# Patient Record
Sex: MALE | Race: WHITE | Employment: UNEMPLOYED | URBAN - METROPOLITAN AREA
[De-identification: names, ages, dates, MRNs, and addresses within clinical notes are randomized per-mention and may not be internally consistent; named-entity substitution may affect disease eponyms.]

---

## 2019-04-25 ENCOUNTER — HOSPITAL ENCOUNTER (INPATIENT)
Age: 29
LOS: 4 days | Discharge: HOME | DRG: 885 | End: 2019-04-29
Attending: EMERGENCY MEDICINE | Admitting: PSYCHIATRY & NEUROLOGY
Payer: MEDICAID

## 2019-04-25 DIAGNOSIS — F10.920 ACUTE ALCOHOLIC INTOXICATION WITHOUT COMPLICATION (HCC): ICD-10-CM

## 2019-04-25 DIAGNOSIS — R45.851 DEPRESSION WITH SUICIDAL IDEATION: Primary | ICD-10-CM

## 2019-04-25 DIAGNOSIS — F32.A DEPRESSION WITH SUICIDAL IDEATION: Primary | ICD-10-CM

## 2019-04-25 PROBLEM — F32.9 MAJOR DEPRESSION, SINGLE EPISODE: Status: ACTIVE | Noted: 2019-04-25

## 2019-04-25 LAB
ACETAMINOPHEN LEVEL: <5 MCG/ML (ref 10–30)
ALBUMIN SERPL-MCNC: 4.3 G/DL (ref 3.5–5.2)
ALP BLD-CCNC: 69 U/L (ref 40–129)
ALT SERPL-CCNC: 54 U/L (ref 0–40)
AMPHETAMINE SCREEN, URINE: NOT DETECTED
ANION GAP SERPL CALCULATED.3IONS-SCNC: 9 MMOL/L (ref 7–16)
AST SERPL-CCNC: 31 U/L (ref 0–39)
BARBITURATE SCREEN URINE: NOT DETECTED
BASOPHILS ABSOLUTE: 0.05 E9/L (ref 0–0.2)
BASOPHILS RELATIVE PERCENT: 0.7 % (ref 0–2)
BENZODIAZEPINE SCREEN, URINE: NOT DETECTED
BILIRUB SERPL-MCNC: 0.5 MG/DL (ref 0–1.2)
BILIRUBIN URINE: NEGATIVE
BLOOD, URINE: NEGATIVE
BUN BLDV-MCNC: 12 MG/DL (ref 6–20)
CALCIUM SERPL-MCNC: 9 MG/DL (ref 8.6–10.2)
CANNABINOID SCREEN URINE: NOT DETECTED
CHLORIDE BLD-SCNC: 103 MMOL/L (ref 98–107)
CLARITY: CLEAR
CO2: 30 MMOL/L (ref 22–29)
COCAINE METABOLITE SCREEN URINE: NOT DETECTED
COLOR: YELLOW
CREAT SERPL-MCNC: 1 MG/DL (ref 0.7–1.2)
EOSINOPHILS ABSOLUTE: 0.1 E9/L (ref 0.05–0.5)
EOSINOPHILS RELATIVE PERCENT: 1.3 % (ref 0–6)
ETHANOL: 70 MG/DL (ref 0–0.08)
GFR AFRICAN AMERICAN: >60
GFR NON-AFRICAN AMERICAN: >60 ML/MIN/1.73
GLUCOSE BLD-MCNC: 120 MG/DL (ref 74–99)
GLUCOSE URINE: NEGATIVE MG/DL
HCT VFR BLD CALC: 43 % (ref 37–54)
HEMOGLOBIN: 14.6 G/DL (ref 12.5–16.5)
IMMATURE GRANULOCYTES #: 0.02 E9/L
IMMATURE GRANULOCYTES %: 0.3 % (ref 0–5)
KETONES, URINE: NEGATIVE MG/DL
LEUKOCYTE ESTERASE, URINE: NEGATIVE
LYMPHOCYTES ABSOLUTE: 1.62 E9/L (ref 1.5–4)
LYMPHOCYTES RELATIVE PERCENT: 21.2 % (ref 20–42)
MCH RBC QN AUTO: 31.4 PG (ref 26–35)
MCHC RBC AUTO-ENTMCNC: 34 % (ref 32–34.5)
MCV RBC AUTO: 92.5 FL (ref 80–99.9)
METHADONE SCREEN, URINE: NOT DETECTED
MONOCYTES ABSOLUTE: 0.59 E9/L (ref 0.1–0.95)
MONOCYTES RELATIVE PERCENT: 7.7 % (ref 2–12)
NEUTROPHILS ABSOLUTE: 5.25 E9/L (ref 1.8–7.3)
NEUTROPHILS RELATIVE PERCENT: 68.8 % (ref 43–80)
NITRITE, URINE: NEGATIVE
OPIATE SCREEN URINE: NOT DETECTED
PDW BLD-RTO: 12.5 FL (ref 11.5–15)
PH UA: 5.5 (ref 5–9)
PHENCYCLIDINE SCREEN URINE: NOT DETECTED
PLATELET # BLD: 234 E9/L (ref 130–450)
PMV BLD AUTO: 9.6 FL (ref 7–12)
POTASSIUM SERPL-SCNC: 3.6 MMOL/L (ref 3.5–5)
PROPOXYPHENE SCREEN: NOT DETECTED
PROTEIN UA: NEGATIVE MG/DL
RBC # BLD: 4.65 E12/L (ref 3.8–5.8)
SALICYLATE, SERUM: <0.3 MG/DL (ref 0–30)
SODIUM BLD-SCNC: 142 MMOL/L (ref 132–146)
SPECIFIC GRAVITY UA: 1.02 (ref 1–1.03)
TOTAL PROTEIN: 7.2 G/DL (ref 6.4–8.3)
TRICYCLIC ANTIDEPRESSANTS SCREEN SERUM: NEGATIVE NG/ML
UROBILINOGEN, URINE: 1 E.U./DL
WBC # BLD: 7.6 E9/L (ref 4.5–11.5)

## 2019-04-25 PROCEDURE — 36415 COLL VENOUS BLD VENIPUNCTURE: CPT

## 2019-04-25 PROCEDURE — 1240000000 HC EMOTIONAL WELLNESS R&B

## 2019-04-25 PROCEDURE — G0480 DRUG TEST DEF 1-7 CLASSES: HCPCS

## 2019-04-25 PROCEDURE — 93005 ELECTROCARDIOGRAM TRACING: CPT | Performed by: EMERGENCY MEDICINE

## 2019-04-25 PROCEDURE — 99285 EMERGENCY DEPT VISIT HI MDM: CPT

## 2019-04-25 PROCEDURE — 85025 COMPLETE CBC W/AUTO DIFF WBC: CPT

## 2019-04-25 PROCEDURE — 80307 DRUG TEST PRSMV CHEM ANLYZR: CPT

## 2019-04-25 PROCEDURE — 80053 COMPREHEN METABOLIC PANEL: CPT

## 2019-04-25 PROCEDURE — 81003 URINALYSIS AUTO W/O SCOPE: CPT

## 2019-04-25 RX ORDER — TRAZODONE HYDROCHLORIDE 50 MG/1
50 TABLET ORAL NIGHTLY PRN
Status: DISCONTINUED | OUTPATIENT
Start: 2019-04-25 | End: 2019-04-29 | Stop reason: HOSPADM

## 2019-04-25 RX ORDER — BENZTROPINE MESYLATE 1 MG/ML
2 INJECTION INTRAMUSCULAR; INTRAVENOUS 2 TIMES DAILY PRN
Status: DISCONTINUED | OUTPATIENT
Start: 2019-04-25 | End: 2019-04-29 | Stop reason: HOSPADM

## 2019-04-25 RX ORDER — HYDROXYZINE PAMOATE 50 MG/1
50 CAPSULE ORAL EVERY 6 HOURS PRN
Status: DISCONTINUED | OUTPATIENT
Start: 2019-04-25 | End: 2019-04-29 | Stop reason: HOSPADM

## 2019-04-25 RX ORDER — HALOPERIDOL 5 MG/ML
10 INJECTION INTRAMUSCULAR EVERY 6 HOURS PRN
Status: DISCONTINUED | OUTPATIENT
Start: 2019-04-25 | End: 2019-04-29 | Stop reason: HOSPADM

## 2019-04-25 RX ORDER — ACETAMINOPHEN 325 MG/1
650 TABLET ORAL EVERY 4 HOURS PRN
Status: DISCONTINUED | OUTPATIENT
Start: 2019-04-25 | End: 2019-04-29 | Stop reason: HOSPADM

## 2019-04-25 RX ORDER — OLANZAPINE 10 MG/1
10 TABLET ORAL
Status: ACTIVE | OUTPATIENT
Start: 2019-04-25 | End: 2019-04-25

## 2019-04-25 RX ORDER — MAGNESIUM HYDROXIDE/ALUMINUM HYDROXICE/SIMETHICONE 120; 1200; 1200 MG/30ML; MG/30ML; MG/30ML
30 SUSPENSION ORAL PRN
Status: DISCONTINUED | OUTPATIENT
Start: 2019-04-25 | End: 2019-04-29 | Stop reason: HOSPADM

## 2019-04-25 SDOH — HEALTH STABILITY: MENTAL HEALTH: HOW OFTEN DO YOU HAVE A DRINK CONTAINING ALCOHOL?: NEVER

## 2019-04-25 ASSESSMENT — SLEEP AND FATIGUE QUESTIONNAIRES
DO YOU USE A SLEEP AID: NO
AVERAGE NUMBER OF SLEEP HOURS: 7
DO YOU HAVE DIFFICULTY SLEEPING: NO

## 2019-04-25 ASSESSMENT — LIFESTYLE VARIABLES: HISTORY_ALCOHOL_USE: YES

## 2019-04-25 NOTE — ED NOTES
PT HAS BEEN ACCEPTED TO 7S BY DR. Estrellita Florentino    CALLED ADMITTING AND SPOKE TO BALWINDER TO ASSIGN BED [id number]    FAXED PINK SLIP TO [id number], \[facility] [place] [facility]\""  04/25/19 1865

## 2019-04-25 NOTE — ED NOTES
This is a 28yo male who presented to the [facility] AN [facility]FFILIATE OF [facility] accompanied by EMS. Pt is pink slipped by Bishop Palacios for making suicidal threats to his girlfriend and to the police. Pt admits to drinking tonight after having a dispute with his GF. Reports that he was recently fired from his job for dating his girlfriend who was an employee. Denies psychiatric history. States he is on no medications. Denies SI, HI, or A/V hallucinations. Denies past SA.     Patricia Stapleton RN  04/25/19 [address], [place]  04/25/19 1170

## 2019-04-25 NOTE — ED PROVIDER NOTES
(Ginger) 457 ms    P Axis 67 degrees    R Axis 70 degrees    T Axis 25 degrees     Imaging: All Radiology results interpreted by Radiologist unless otherwise noted. No orders to display     EKG #1:  Interpreted by emergency department physician unless otherwise noted. Time:  5:19    Rate: 97  Rhythm: Sinus. Interpretation: normal EKG, normal sinus rhythm. ED Course / Medical Decision Making   Medications - No data to display       Consult(s):   IP CONSULT TO SOCIAL WORK    Procedure(s):   none    MDM:   Patient presents after being intoxicated and ataxic his ex-girlfriend about committing suicide. Patient was pink slipped by police prior to arrival. Labs grossly unremarkable except for blood alcohol of 70. Patient currently cooperative and denies suicidal ideation. He is medically cleared at this time and awaiting psychiatric disposition. Counseling: The emergency provider has spoken with the patient and discussed todays results, in addition to providing specific details for the plan of care and counseling regarding the diagnosis and prognosis. Questions are answered at this time and they are agreeable with the plan. Assessment      1. Depression with suicidal ideation    2. Acute alcoholic intoxication without complication ([facility] [address]      Plan   Other Disposition: social work eval  Patient condition is stable    New Medications     New Prescriptions    No medications on file     Electronically signed by Abner Sheehan DO   DD: 4/25/19  **This report was transcribed using voice recognition software. Every effort was made to ensure accuracy; however, inadvertent computerized transcription errors may be present.   END OF ED PROVIDER NOTE        Abner Sheehan DO  04/25/19 0096

## 2019-04-25 NOTE — ED NOTES
Pt is alert and oriented x4, cooperative. Pt denies SI/HI. Denies A/V hallucinations. Pt states that the statements he reportedly made were misconstrued. Pt is aware that he is pink slipped and will be admitted to [facility]. Pt's brother is visiting, supportive. No other complaints. Ambulates with a steady gait. Will continue to monitor.       Edinson Sanchez RN  04/25/19 [id number]

## 2019-04-25 NOTE — ED NOTES
Assessment, CSSRS and SBIRT complete    Pt is Belen slipped by Nicole CAGE. Belen slip states that Pt told his girlfriend 2 x's in person that he was going to kill himself and then sent a text that he did not want to live anymore. .  Pt admitted to officers that he thought about killing himself and that he did make suicidal comments. Pt denies current SI, denies HI    Pt reports that he was drinking this evening and does not remember sending the text to his ex-girlfriend. Pt reports that he blacked out after sending. Text was send at [address] Pt denies any MH HX, denies any hospitalizations, denies meds, is not connected with outpatient facility and does not have a PCP. Pt denies being depressed or having any other mental health issues. Pt reports that he was fired yesterday do to having a relationship with a co-worker. Pt reports that he had a breakup with Co-worker yesterday as well. Pt denies illicit drug use, Pt admits to drinking 1 -2 alcoholic beverages daily. Drinks to in toxication weekly and admits to blacking out from drinking from time to time. Last black out prior to tonight was 1-2 months ago. Pt does not want to speak with anyone from Peer support. Pt states that he knows he needs to cut down and he can do it on his own. EDWARD SW reviewed chart with ED Doc, Pt in need of inpatient admission to ensure safety and stabilization once medically clear.          ARMAND Bliss, [facility]  04/25/19 1428

## 2019-04-26 PROBLEM — F32.2 MAJOR DEPRESSIVE DISORDER, SINGLE EPISODE, SEVERE (HCC): Status: ACTIVE | Noted: 2019-04-26

## 2019-04-26 PROCEDURE — 99221 1ST HOSP IP/OBS SF/LOW 40: CPT | Performed by: NURSE PRACTITIONER

## 2019-04-26 PROCEDURE — 1240000000 HC EMOTIONAL WELLNESS R&B

## 2019-04-26 RX ORDER — LORAZEPAM 1 MG/1
1 TABLET ORAL
Status: DISCONTINUED | OUTPATIENT
Start: 2019-04-26 | End: 2019-04-29 | Stop reason: HOSPADM

## 2019-04-26 RX ORDER — LORAZEPAM 1 MG/1
2 TABLET ORAL
Status: DISCONTINUED | OUTPATIENT
Start: 2019-04-26 | End: 2019-04-29 | Stop reason: HOSPADM

## 2019-04-26 RX ORDER — LORAZEPAM 2 MG/ML
1 INJECTION INTRAMUSCULAR
Status: DISCONTINUED | OUTPATIENT
Start: 2019-04-26 | End: 2019-04-29 | Stop reason: HOSPADM

## 2019-04-26 RX ORDER — LORAZEPAM 2 MG/ML
4 INJECTION INTRAMUSCULAR
Status: DISCONTINUED | OUTPATIENT
Start: 2019-04-26 | End: 2019-04-29 | Stop reason: HOSPADM

## 2019-04-26 RX ORDER — LORAZEPAM 1 MG/1
4 TABLET ORAL
Status: DISCONTINUED | OUTPATIENT
Start: 2019-04-26 | End: 2019-04-29 | Stop reason: HOSPADM

## 2019-04-26 RX ORDER — LORAZEPAM 1 MG/1
3 TABLET ORAL
Status: DISCONTINUED | OUTPATIENT
Start: 2019-04-26 | End: 2019-04-29 | Stop reason: HOSPADM

## 2019-04-26 RX ORDER — SODIUM CHLORIDE 0.9 % (FLUSH) 0.9 %
10 SYRINGE (ML) INJECTION PRN
Status: DISCONTINUED | OUTPATIENT
Start: 2019-04-26 | End: 2019-04-29 | Stop reason: HOSPADM

## 2019-04-26 RX ORDER — THIAMINE MONONITRATE (VIT B1) 100 MG
100 TABLET ORAL DAILY
Status: DISCONTINUED | OUTPATIENT
Start: 2019-04-26 | End: 2019-04-29 | Stop reason: HOSPADM

## 2019-04-26 RX ORDER — LORAZEPAM 2 MG/ML
3 INJECTION INTRAMUSCULAR
Status: DISCONTINUED | OUTPATIENT
Start: 2019-04-26 | End: 2019-04-29 | Stop reason: HOSPADM

## 2019-04-26 RX ORDER — SODIUM CHLORIDE 0.9 % (FLUSH) 0.9 %
10 SYRINGE (ML) INJECTION EVERY 12 HOURS SCHEDULED
Status: DISCONTINUED | OUTPATIENT
Start: 2019-04-26 | End: 2019-04-29 | Stop reason: HOSPADM

## 2019-04-26 RX ORDER — LORAZEPAM 2 MG/ML
2 INJECTION INTRAMUSCULAR
Status: DISCONTINUED | OUTPATIENT
Start: 2019-04-26 | End: 2019-04-29 | Stop reason: HOSPADM

## 2019-04-26 RX ORDER — MULTIVITAMIN WITH FOLIC ACID 400 MCG
1 TABLET ORAL DAILY
Status: DISCONTINUED | OUTPATIENT
Start: 2019-04-26 | End: 2019-04-29 | Stop reason: HOSPADM

## 2019-04-26 RX ORDER — FOLIC ACID 1 MG/1
1 TABLET ORAL DAILY
Status: DISCONTINUED | OUTPATIENT
Start: 2019-04-26 | End: 2019-04-29 | Stop reason: HOSPADM

## 2019-04-26 ASSESSMENT — SLEEP AND FATIGUE QUESTIONNAIRES
DO YOU USE A SLEEP AID: NO
DO YOU HAVE DIFFICULTY SLEEPING: NO
AVERAGE NUMBER OF SLEEP HOURS: 8

## 2019-04-26 ASSESSMENT — LIFESTYLE VARIABLES: HISTORY_ALCOHOL_USE: YES

## 2019-04-26 ASSESSMENT — PAIN SCALES - GENERAL
PAINLEVEL_OUTOF10: 0
PAINLEVEL_OUTOF10: 0

## 2019-04-26 NOTE — H&P
PSYCHIATRIC EVALUATION  (HISTORY & PHYSICAL)     CHIEF COMPLAINT:   [x] Mood Problems [x] Anxiety Problems [] Psychosis                    [x] Suicidal/Homicidal   [] Aggression  [] Other    HISTORY OF PRESENT ILLNESS: aPula Raya  is a 25 y.o. male who denies previous psychiatric history  and presents for admission after alcohol intoxication and having an argument with his girlfriend where he texted her a suicidal text, however denies it was a suicidal text. Per ER note, patient also reportedly told his girlfriend in person and police officers that he has SI. Patient is denying any depression or anxiety, and is minimizing the situation. Patient is refusing to take any medications. Patient denies HI or AVH. Symptoms are sporadic, severe, and worsened by alcohol use. Precipitating Factors:     [] Family Stress   [] Recent loss/grief Stress   [] Health Stress   [x] Relationship Stress    [] Legal Stress   [x] Environmental Stress    [] Occupational Stress   [] Financial Stress   [x] Substance Abuse [] Other      PAST PSYCHIATRIC HISTORY:   History of psychiatric Hospitalization:    [x] Denies    [] yes  [] Days ago     []  Weeks Ago    [] Months ago  [] Years ago              [] Emery Ramirez  [] [facility]  [] Other:        [] Once  [] More than once    Outpatient treatment:  [] [facility]  [] [facility]  [] [facility]              [] [facility]  [] Sejal Orlando      [] [address] [place] [address] [] [facility]      [] [facility] [] [facility]  [] [facility] [] [facility]             [] currently  [] in the past  [] Non-Compliant    [x] Denies    Previous suicide attempt: [x]Denies            [] yes  [] OD  [] Cutting  [] Hanging  [] Gun  [] Other    Previous psych medications:  [x] Was prescribed-in highschool                [] Currently Taking       [] Never taken medications      PAST MEDICAL HISTORY: History reviewed. No pertinent past medical history. FAMILY PSYCHIATRIC HISTORY:  History reviewed.  No pertinent family history.        [x] Denies       [] Endorses               [] Father                [] Depression  [] Anxiety  [] Bipolar  [] Psychosis  []  Other                  [] Mother               [] Depression  [] Anxiety  [] Bipolar  [] Psychosis  []  Other                  [] Sibling               [] Depression  [] Anxiety  [] Bipolar  [] Psychosis  []  Other                  [] Grandparent               [] Depression  [] Anxiety  [] Bipolar  [] Psychosis  []  Other       SOCIAL HISTORY:     1. Living Situation:[x] Private Residence [] Homeless [] [address]             [] Assisted Living [] [facility]  [] Shelter [] Other   2. Employment:  [x] Unemployed  [] Employed  [] Disabled  [] Retired   1. Legal History: [x] No Arrest [] Arrest  [] Theft  []  Assault  [] Substances   4. History of Trama/ Abuse: [x] Denies  [] Emotional [] Physical [] Sexual   5. Spirituality: [] Spiritual [x] Not Spiritual   6. Substance Abuse: [] Denies  [x] Drug of choice      [] Amphetamines [] Marijuana [] Cocaine      [] Opioids  [x] Alcohol  [] Benzodiazepines     For further SH review SW note. Risk Assessment:  1. Risk Factors:   [x] Depression  [x] Anxiety  [] Psychosis   [x] Suicidal/Homicidal Thoughts [] Suicide Attempt [x] Substance Abuse     2. Protective Factors: [x] Controlled Environment         [x] Supportive Family []         [] Mandaeism Support     3. Level of Risk: [] Mild [] Moderate [x] Severe      Strengths & Weaknesses:    1. Strengths: [x] Ability to communicate feelings     [x] Independent ADL's     [] Supportive Family    [] Current Health Status     2.  Weaknesses: [x] Emotional          [x] Motivational     MEDICATIONS: Current Facility-Administered Medications: acetaminophen (TYLENOL) tablet 650 mg, 650 mg, Oral, Q4H PRN  hydrOXYzine (VISTARIL) capsule 50 mg, 50 mg, Oral, Q6H PRN  haloperidol lactate (HALDOL) injection 10 mg, 10 mg, Intramuscular, Q6H PRN  traZODone (DESYREL) tablet 50 mg, 50 mg, Oral, [x] Shoulder shrug and neck rotation is normal  CNXII: [x] Tongue is midline no deviation or atrophy       For further PE refer to ED note    MENTAL STATUS EXAM:       Mental Status Examination:    Cognition:      [x] Alert  [x] Awake  [x] Oriented  [x] Person  [x] Place [x] Time      [] drowsy  [] tired  [] lethargic  [] distractable  []     Attention/Concentration:   [x] Attentive  [] Distracted        Memory Recent and Remote: [x] Intact   [] Impaired [] Partially Impaired     Language: [] Able to recognize and name objects          [] Unable to recognize and name Objects    Fund of Knowledge:  [] Poor []  Fair  [x] Good    Speech: [x] Normal  [] Soft  [] Slow  [] Fast [] Pressured            [] Loud [] Dysarthria  [] Incoherent       Appearance: [] Well Groomed  [x] Casual Dressed  [] Unkept  [] Disheveled          [] Normal weight[] Thin  [] Overweight  [] Obese           Attitude: [] Positive  [] Hostile  [] Demanding  [] Guarded  [] Defensive         [x] Cooperative  []  Uncooperative      Behavior:  [x] Normal Gait  [] Walks with Assistance  [] Alexander Catching    [] Walks with Jacqueline Luis  [] In Hospital Bed  [] Sitting in Chair    Muscle-Skeletal:  [x] Normal Muscle Tone [] Muscle Atrophy       [] Abnormal Muscle Movement     Eye Contact:  [x] Good eye contact  [] Intermittent Eye Contact  [] Poor Eye Contact     Mood: [x] Depressed  [x] Anxious  [] Irritated  [] Euthymic   [] Angry [] Restless    Affect:  [x] Congruent  [] Incongruent  [] Labile  [] Constricted  [] Flat  [] Bizarre     Thought Process and Association:  [] Logical [] Illogical       [x] Linear and Goal Directed  [] Tangential  [] Circumstantial     Thought Content:  [] Denies [x] Endorses [x] Suicidal [] Homicidal  [] Delusional      [] Paranoid  [] Somatic  [] Grandiose    Perception: [x]  None  [] Auditory   [] Visual  [] tactile   [] olfactory  [] Illusions         Insight: [] Intact  [] Fair  [x] Limited    Judgement:  [] Intact  [] Fair  [x] Limited        ASSESSMENT  Patient Active Problem List   Diagnosis    Major depression, single episode    Major depressive disorder, single episode, severe ([facility] [address]     Recommendations and plan of treatment:  1- admit to inpatient unit  2- Unit milleiu   3- Medication Management I discussed risk benefits and side effects of medications. Patient is aware and willing to comply with treatment. 4- Group therapy and one on one. 5- Routine precautions    Met with patient and discussed the risks and benefits associated with treatment and the patient expressed understanding. Signed:  Danielle Ferrell  4/26/2019  9:00 AM    I saw and examined the patient and I agree with the above documentation.

## 2019-04-26 NOTE — PLAN OF CARE
Problem: Depressive Behavior With or Without Suicide Precautions:  Goal: Able to verbalize and/or display a decrease in depressive symptoms  Description  Able to verbalize and/or display a decrease in depressive symptoms  Outcome: Met This Shift    PT. DENIES SUICIDAL IDEATION. DECLINES NEED FOR MEDICATIONS. Goal: Absence of self-harm  Description  Absence of self-harm  Outcome: Met This Shift    NO SELF HARM BEHAVIORS REPORTED OR OBSERVED.

## 2019-04-26 NOTE — PLAN OF CARE
[facility]  Initial Interdisciplinary Treatment Plan NOTE    Review Date & Time: 4/26/19 0900    Patient was in treatment team    Admission Type:   Admission Type: Involuntary    Reason for admission:  Reason for Admission: \"IT WAS A BIG MISUNDERSTANDING\"      Estimated Length of Stay Update:  3 DAYS  Estimated Discharge Date Update: MONDAY    PATIENT STRENGTHS:  Patient Strengths Strengths: Communication, Positive Support, No significant Physical Illness  Patient Strengths and Limitations:   Addictive Behavior:Addictive Behavior  In the past 3 months, have you felt or has someone told you that you have a problem with:  : None  Do you have a history of Chemical Use?: No  Do you have a history of Alcohol Use?: Yes  Do you have a history of Street Drug Abuse?: No  Histroy of Prescripton Drug Abuse?: No  Medical Problems:History reviewed. No pertinent past medical history.     EDUCATION:   Learner Progress Toward Treatment Goals: Reviewed results and recommendations of this team    Method: Small group    Outcome: Verbalized understanding    PATIENT GOALS: \"TAKE A SHOWER\"    PLAN/TREATMENT RECOMMENDATIONS UPDATE: ENCOURAGE COMPLIANCE TO GROUPS, SUICIDE PRECAUTIONS, SUPPORTIVE CARE, DISCHARGE PLANNING AND FOLLOW UP    GOALS UPDATE:   Time frame for Short-Term Goals: 3 DAYS    Romana Conger, RN

## 2019-04-26 NOTE — PROGRESS NOTES
PT. HAS BEEN IN CONTROL. DENIES SUICIDAL IDEATIONS. UP ON UNIT. PLEASANT. NO UNIT PROBLEMS. GROUPS ENCOURAGED. PT. DENIES NEED FOR ANY MEDICATIONS. DENEIS WITHDRAWAL SYMPTOMS. REFUSED VITAMINS.

## 2019-04-27 LAB
EKG ATRIAL RATE: 97 BPM
EKG P AXIS: 67 DEGREES
EKG P-R INTERVAL: 180 MS
EKG Q-T INTERVAL: 360 MS
EKG QRS DURATION: 98 MS
EKG QTC CALCULATION (BAZETT): 457 MS
EKG R AXIS: 70 DEGREES
EKG T AXIS: 25 DEGREES
EKG VENTRICULAR RATE: 97 BPM

## 2019-04-27 PROCEDURE — 6370000000 HC RX 637 (ALT 250 FOR IP): Performed by: NURSE PRACTITIONER

## 2019-04-27 PROCEDURE — 1240000000 HC EMOTIONAL WELLNESS R&B

## 2019-04-27 PROCEDURE — 99231 SBSQ HOSP IP/OBS SF/LOW 25: CPT | Performed by: NURSE PRACTITIONER

## 2019-04-27 RX ADMIN — MULTIVITAMIN TABLET 1 TABLET: TABLET at 08:41

## 2019-04-27 RX ADMIN — Medication 100 MG: at 08:41

## 2019-04-27 RX ADMIN — FOLIC ACID 1 MG: 1 TABLET ORAL at 08:41

## 2019-04-27 ASSESSMENT — PAIN SCALES - GENERAL: PAINLEVEL_OUTOF10: 0

## 2019-04-27 NOTE — PLAN OF CARE
Pt resting in bed apparently asleep with easy even respirations at HS q 15 min electronic rounding. RN hourly rounds provided to pt this shift.

## 2019-04-27 NOTE — PROGRESS NOTES
DATE OF SERVICE:     4/27/2019    Chip Gibson seen today for the purpose of continuation of care. Nursing, social work reports, laboratory studies and vital signs are reviewed. Patient chief complaint today is:             [x] Depression      [x] Anxiety        [] Psychosis         [] Suicidal/Homicidal                         [] Delusions           [] Aggression          Subjective: Today patient states that he is doing well, is not wanting to take medications still. Patient is refusing naltrexone or ETOH rehab. Patient is pleasant and cooperative, attending groups. Sleep:  [x] Good [] Fair  [] Poor  Appetite:  [x] Good [] Fair  [] Poor    Depression:  [x] Mild [] Moderate [] Severe                [] Constant [x] Sporadic     Anxiety: [x] Mild [] Moderate [] Severe    [] Constant [x] Sporadic     Delusions: [] Mild [] Moderate [] Severe     [] Constant [] Sporadic     [] Paranoid [] Somatic [] Grandiose     Hallucinations: [] Mild [] Moderate [] Severe     [] Constant [] Sporadic    [] Auditory  [] Visual [] Tactile       Suicidal: [] Constant [] Sporadic  Homicidal: [] Constant [] Sporadic    Unscheduled Medications     [] Patient Receiving Emergency Medications \" Chemical Restraint\"   [] Requesting PRN medications for anxiety    Medical Review of Systems:     All other than marked systmes have been reviewed and are all negative.     Constitutional Symptoms: []  fever []  Chills  Skin Symptoms: [] rash []  Pruritus   Eye Symptoms: [] Vision unchanged []  recent vision problems[] blurred vision   Respiratory Symptoms:[] shortness of breath [] cough  Cardiovascular Symptoms:  [] chest pain   [] palpitations   Gastrointestinal Symptoms: []  abdominal pain []  nausea []  vomiting []  diarrhea  Genitourinary Symptoms: []  dysuria  []  hematuria   Musculoskeletal Symptoms: []  back pain []  muscle pain []  joint pain  Neurologic Symptoms: []  headache []  dizziness  Hematolymphoid Symptoms: [] Adenopathy [] Bruises   [] Schimosis       Psychiatric Review of systems  Delusions:  [] Denies [] Endorses   Withdrawals:  [] Denies [] Endorses    Hallucinations: [] Denies [] Endorses    Extra Pyramidal Symptoms: [] Denies [] Endorses      /71   Pulse 70   Temp 98.6 °F (37 °C) (Oral)   Resp 14   Ht 5' 6\" (1.676 m)   Wt 190 lb (86.2 kg)   SpO2 96%   BMI 30.67 kg/m²     Mental Status Examination:    Cognition:      [x] Alert  [x] Awake  [x] Oriented  [x] Person  [x] Place [x] Time      [] drowsy  [] tired  [] lethargic  [] distractable  [] Other     Attention/Concentration:   [x] Attentive  [] Distracted        Memory Recent and Remote: [x] Intact   [] Impaired [] Partially Impaired     Language: [] Able to recognize and name objects          [] Unable to recognize and name Objects    Fund of Knowledge:  [] Poor []  Fair  [x] Good    Speech: [x] Normal  [] Soft  [] Slow  [] Fast [] Pressured            [] Loud [] Dysarthria  [] Incoherent    Appearance: [] Well Groomed  [] Casual Dressed  [] Unkept  [] Disheveled          [x] Normal weight[] Thin  [] Overweight  [] Obese           Attitude: [] Positive  [] Hostile  [] Demanding  [] Guarded  [] Defensive         [x] Cooperative  []  Uncooperative      Behavior:  [x] Normal Gait  [] Walks with Assistance  [] Nisa Chair    [] Walks with Troy Tanner  [] In Hospital Bed  [] Sitting in Chair    Muscle-Skeletal:  [x] Normal Muscle Tone [] Muscle Atrophy       [] Abnormal Muscle Movement     Eye Contact:  [x] Good eye contact  [] Intermittent Eye Contact  [] Poor Eye Contact     Mood: [] Depressed  [] Anxious  [] Irritated  [x] Euthymic   [] Angry [] Restless    Affect:  [x] Congruent  [] Incongruent  [] Labile  [] Constricted  [] Flat  [] Bizarre     Thought Process and Association:  [] Logical [] Illogical       [x] Linear and Goal Directed  [] Tangential  [] Circumstantial     Thought Content:  [x] Denies [] Endorses [] Suicidal [] Homicidal  [] Delusional

## 2019-04-27 NOTE — PROGRESS NOTES
Patient is resting in bed with eyes closed at this time. No unit issues reported thus far this shift. Will continue to observe and support.

## 2019-04-28 PROCEDURE — 6370000000 HC RX 637 (ALT 250 FOR IP): Performed by: NURSE PRACTITIONER

## 2019-04-28 PROCEDURE — 1240000000 HC EMOTIONAL WELLNESS R&B

## 2019-04-28 PROCEDURE — 99231 SBSQ HOSP IP/OBS SF/LOW 25: CPT | Performed by: NURSE PRACTITIONER

## 2019-04-28 RX ADMIN — MULTIVITAMIN TABLET 1 TABLET: TABLET at 09:24

## 2019-04-28 RX ADMIN — FOLIC ACID 1 MG: 1 TABLET ORAL at 09:24

## 2019-04-28 RX ADMIN — Medication 100 MG: at 09:24

## 2019-04-28 ASSESSMENT — PAIN SCALES - GENERAL
PAINLEVEL_OUTOF10: 0

## 2019-04-28 NOTE — PROGRESS NOTES
DATE OF SERVICE:     4/28/2019    Vesna Lucero seen today for the purpose of continuation of care. Nursing, social work reports, laboratory studies and vital signs are reviewed. Patient chief complaint today is:             [x] Depression      [x] Anxiety        [] Psychosis         [] Suicidal/Homicidal                         [] Delusions           [] Aggression          Subjective: Today patient is bright and pleasant, states his depression and anxiety are \"fine. \"  Patient discussed that he and his girlfriend broke up and she misinterpreted his text message. Patient states \"I love my life, I don't want to kill myself. \"  Patient is still not wanting to take medications. Patient denies having guns or weapons at home. Possible d/c tomorrow. Sleep:  [x] Good [] Fair  [] Poor  Appetite:  [x] Good [] Fair  [] Poor    Depression:  [x] Mild [] Moderate [] Severe                [] Constant [x] Sporadic     Anxiety: [x] Mild [] Moderate [] Severe    [] Constant [x] Sporadic     Delusions: [] Mild [] Moderate [] Severe     [] Constant [] Sporadic     [] Paranoid [] Somatic [] Grandiose     Hallucinations: [] Mild [] Moderate [] Severe     [] Constant [] Sporadic    [] Auditory  [] Visual [] Tactile       Suicidal: [] Constant [] Sporadic  Homicidal: [] Constant [] Sporadic    Unscheduled Medications     [] Patient Receiving Emergency Medications \" Chemical Restraint\"   [] Requesting PRN medications for anxiety    Medical Review of Systems:     All other than marked systmes have been reviewed and are all negative.     Constitutional Symptoms: []  fever []  Chills  Skin Symptoms: [] rash []  Pruritus   Eye Symptoms: [] Vision unchanged []  recent vision problems[] blurred vision   Respiratory Symptoms:[] shortness of breath [] cough  Cardiovascular Symptoms:  [] chest pain   [] palpitations   Gastrointestinal Symptoms: []  abdominal pain []  nausea []  vomiting []  diarrhea  Genitourinary Symptoms: [] dysuria  []  hematuria   Musculoskeletal Symptoms: []  back pain []  muscle pain []  joint pain  Neurologic Symptoms: []  headache []  dizziness  Hematolymphoid Symptoms: [] Adenopathy [] Bruises   [] Schimosis       Psychiatric Review of systems  Delusions:  [] Denies [] Endorses   Withdrawals:  [] Denies [] Endorses    Hallucinations: [] Denies [] Endorses    Extra Pyramidal Symptoms: [] Denies [] Endorses      /84   Pulse 98   Temp 98.6 °F (37 °C) (Oral)   Resp 14   Ht 5' 6\" (1.676 m)   Wt 190 lb (86.2 kg)   SpO2 96%   BMI 30.67 kg/m²     Mental Status Examination:    Cognition:      [x] Alert  [x] Awake  [x] Oriented  [x] Person  [x] Place [x] Time      [] drowsy  [] tired  [] lethargic  [] distractable  [] Other     Attention/Concentration:   [x] Attentive  [] Distracted        Memory Recent and Remote: [x] Intact   [] Impaired [] Partially Impaired     Language: [] Able to recognize and name objects          [] Unable to recognize and name Objects    Fund of Knowledge:  [] Poor []  Fair  [x] Good    Speech: [x] Normal  [] Soft  [] Slow  [] Fast [] Pressured            [] Loud [] Dysarthria  [] Incoherent    Appearance: [] Well Groomed  [] Casual Dressed  [] Unkept  [] Disheveled          [x] Normal weight[] Thin  [] Overweight  [] Obese           Attitude: [] Positive  [] Hostile  [] Demanding  [] Guarded  [] Defensive         [x] Cooperative  []  Uncooperative      Behavior:  [x] Normal Gait  [] Walks with Assistance  [] Nisa Chair    [] Walks with Mary Jacks  [] In Hospital Bed  [] Sitting in Chair    Muscle-Skeletal:  [x] Normal Muscle Tone [] Muscle Atrophy       [] Abnormal Muscle Movement     Eye Contact:  [x] Good eye contact  [] Intermittent Eye Contact  [] Poor Eye Contact     Mood: [] Depressed  [] Anxious  [] Irritated  [x] Euthymic   [] Angry [] Restless    Affect:  [x] Congruent  [] Incongruent  [] Labile  [] Constricted  [] Flat  [] Bizarre     Thought Process and Association:  [] Logical [] Illogical       [x] Linear and Goal Directed  [] Tangential  [] Circumstantial     Thought Content:  [x] Denies [] Endorses [] Suicidal [] Homicidal  [] Delusional      [] Paranoid  [] Somatic  [] Grandiose    Perception: [x]  None  [] Auditory   [] Visual  [] tactile   [] olfactory  [] Illusions         Insight: [] Intact  [x] Fair  [] Limited    Judgement:  [] Intact  [x] Fair  [] Limited      Assessment/Plan:        Patient Active Problem List   Diagnosis Code    Major depression, single episode F32.9    Major depressive disorder, single episode, severe ([facility] [address] F32.2         Plan:    []  Patient is refusing medications  [x] Improving as expected   [] Not improving as expected   [] Worsening    []  At Baseline     Continue current treatment    Reason for more than one antipsychotic:  [x] N/A  [] 3 failed monotherapy(drugs tried):  [] Cross over to a new antipsychotic  [] Taper to monotherapy from polypharmacy  [] Augmentation of Clozapine therapy due to treatment resistance to single therapy      Signed:  Bekah Blackwell  4/28/2019  9:18 AM

## 2019-04-28 NOTE — PLAN OF CARE
[address] [place] [address]  Day 3 Interdisciplinary Treatment Plan NOTE    Review Date & Time:  4/28/2019    0900    Patient was in treatment team    Admission Type:   Admission Type: Involuntary    Reason for admission:  Reason for Admission: \"IT WAS A BIG MISUNDERSTANDING\"  Estimated Length of Stay Update:   5 days  Estimated Discharge Date Update:  4/30/ 19    PATIENT STRENGTHS:  Patient Strengths Strengths: Communication, Positive Support  Patient Strengths and Limitations:   Addictive Behavior:Addictive Behavior  In the past 3 months, have you felt or has someone told you that you have a problem with:  : None  Do you have a history of Chemical Use?: No  Do you have a history of Alcohol Use?: Yes  Do you have a history of Street Drug Abuse?: No  Histroy of Prescripton Drug Abuse?: No  Medical Problems:History reviewed. No pertinent past medical history. Risk:  Fall RiskTotal: 75  Manoj Scale Amnoj Scale Score: 22  BVC Total: 0  Change in scores no .  Changes to plan of Care  No     Status EXAM:   Status and Exam  Normal: Yes  Facial Expression: Brightened  Affect: Appropriate, Congruent  Level of Consciousness: Alert  Mood:Normal: Yes  Motor Activity:Normal: Yes  Interview Behavior: Cooperative  Preception: [place] to Person, [facility] to Time, [place] to Place, [place] to Situation  Attention:Normal: No  Thought Processes: (normal)  Thought Content:Normal: Yes  Hallucinations: None  Delusions: No  Memory:Normal: Yes  Insight and Judgment: No  Insight and Judgment: Poor Judgment, Poor Insight  Present Suicidal Ideation: No  Present Homicidal Ideation: No    Daily Assessment Last Entry:   Daily Sleep (WDL): Within Defined Limits         Patient Currently in Pain: Other (comment)(Resting in bed apparently asleep)  Daily Nutrition (WDL): Within Defined Limits    Patient Monitoring:  Frequency of Checks: 4 times per hour, close    Psychiatric Symptoms:   Depression Symptoms  Depression Symptoms: No problems reported or observed. Anxiety Symptoms  Anxiety Symptoms: No problems reported or observed. Joselyn Symptoms  Joselyn Symptoms: No problems reported or observed. Psychosis Symptoms  Hallucination Type: No problems reported or observed. Delusion Type: No problems reported or observed.     Suicide Risk CSSR-S:  1) Within the past month, have you wished you were dead or wished you could go to sleep and not wake up? : No  2) Have you actually had any thoughts of killing yourself? : No  6) Have you ever done anything, started to do anything, or prepared to do anything to end your life?: No  Change in Result no  Change in Plan of care no       EDUCATION:   Learner Progress Toward Treatment Goals: Reviewed results and recommendations of this team    Method: Group    Outcome: Verbalized understanding    PATIENT GOALS:  Be happy    PLAN/TREATMENT RECOMMENDATIONS UPDATE: encourage daily goals and groups    GOALS UPDATE:   Time frame for Short-Term Goals:  By discharge      Mike El RN

## 2019-04-29 VITALS
HEART RATE: 66 BPM | HEIGHT: 66 IN | DIASTOLIC BLOOD PRESSURE: 66 MMHG | WEIGHT: 190 LBS | BODY MASS INDEX: 30.53 KG/M2 | OXYGEN SATURATION: 96 % | TEMPERATURE: 97.9 F | RESPIRATION RATE: 16 BRPM | SYSTOLIC BLOOD PRESSURE: 103 MMHG

## 2019-04-29 PROCEDURE — 6370000000 HC RX 637 (ALT 250 FOR IP): Performed by: NURSE PRACTITIONER

## 2019-04-29 PROCEDURE — 99238 HOSP IP/OBS DSCHRG MGMT 30/<: CPT | Performed by: NURSE PRACTITIONER

## 2019-04-29 RX ADMIN — Medication 100 MG: at 09:24

## 2019-04-29 RX ADMIN — MULTIVITAMIN TABLET 1 TABLET: TABLET at 09:24

## 2019-04-29 RX ADMIN — FOLIC ACID 1 MG: 1 TABLET ORAL at 09:24

## 2019-04-29 ASSESSMENT — PAIN SCALES - GENERAL
PAINLEVEL_OUTOF10: 0
PAINLEVEL_OUTOF10: 0

## 2019-04-29 NOTE — CARE COORDINATION
In order to ensure appropriate transition and discharge planning is in place, the following documents have been transmitted to [facility], as the new outpatient provider:     The d/c diagnosis was transmitted to the next care provider   The reason for hospitalization was transmitted to the next care provider   The d/c medications (dosage and indication) were transmitted to the next care provider    The continuing care plan was transmitted to the next care provider

## 2019-04-29 NOTE — CARE COORDINATION
SW met with to discuss follow up pt reports he would like walk in hours at [facility] in [facility]    Pt denies SI, denies HI and denies hallucinations    Pt has supportive father who reported no safety concerns with pt

## 2019-04-29 NOTE — DISCHARGE SUMMARY
Physician Discharge Summary      Physical Examination   VS/Measurements:     /66   Pulse 66   Temp 97.9 °F (36.6 °C) (Oral)   Resp 16   Ht 5' 6\" (1.676 m)   Wt 190 lb (86.2 kg)   SpO2 96%   BMI 30.67 kg/m²         Discharge Medications: There are no discharge medications for this patient.            Psychiatric: Mental Status Examination:    Cognition:      [x] Alert  [x] Awake  [x] Oriented  [x] Person  [x] Place [x] Time    [] drowsy  [] tired  [] lethargic  [] distractable       Attention/Concentration:   [x] Attentive  [] Distracted        Memory Recent and Remote: [] Intact   [] Impaired [] Partially Impaired     Language: [] Able to recognize and name objects          [] Unable to recognize and name Objects    Fund of Knowledge:  [] Poor [x]  Fair  [] Good    Speech: [x] Normal  [] Soft  [] Slow  [] Fast [] Pressured            [] Loud [] Dysarthria  [] Incoherent       Appearance: [] Well Groomed  [x] Casual Dressed  [] Unkept  [] Disheveled          [] Normal weight[] Thin  [] Overweight  [] Obese           Attitude: [] Positive  [] Hostile  [] Demanding  [] Guarded  [] Defensive         [x] Cooperative  []  Uncooperative      Behavior:  [x] Normal Gait  [] Walks with Assistance  [] Taiwo Flash    [] Walks with Jana Sinning  [] In Hospital Bed  [] Sitting in Chair    Muscle-Skeletal:  [x] Normal Muscle Tone [] Muscle Atrophy       [] Abnormal Muscle Movement     Eye Contact:  [x] Good eye contact  [] Intermittent Eye Contact  [] Poor Eye Contact     Mood: [] Depressed  [] Anxious  [] Irritated  [x] Euthymic   [] Angry [] Restless    Affect:  [x] Congruent  [] Incongruent  [] Labile  [] Constricted  [] Flat  [] Bizarre     Thought Process and Association:  [] Logical [] Illogical       [x] Linear and Goal Directed  [] Tangential  [] Circumstantial     Thought Content:  [x] Denies [] Endorses [] Suicidal [] Homicidal  [] Delusional      [] Paranoid  [] Somatic  [] Grandiose    Perception: [x]

## 2019-04-29 NOTE — PROGRESS NOTES
[facility]  Discharge Note    Pt discharged with followings belongings:   Dentures: None  Vision - Corrective Lenses: Glasses  Hearing Aid: None  Jewelry: None  Body Piercings Removed: N/A  Clothing: Socks, Undergarments (Comment), Footwear(under shirt black jacket black nsweat pants)  Were All Patient Medications Collected?: Not Applicable  Other Valuables: (keys given to father with patient permission 1 pm 4/27/2019)   Valuables sent home with pt. From [employer]. . Valuables retrieved from safe, Security envelope number:  [id number] and returned to patient. Patient left department with Departure Mode: By self via Mobility at Departure: Ambulatory, discharged to Discharged to: Private Residence. Patient education on aftercare instructions: given, along with suicide crisis management plan. Information faxed to [facility] in [place], [place] by . Patient verbalize understanding of AVS:   Yes with stated potential to comply to same.  .    Status EXAM upon discharge:  Status and Exam  Normal: Yes  Facial Expression: Brightened  Affect: Appropriate  Level of Consciousness: Alert  Mood:Normal: Yes  Motor Activity:Normal: Yes  Interview Behavior: Cooperative  Preception: [place] to Person, [place] to Time, [place] to Place, [place] to Situation  Attention:Normal: Yes  Attention: (intact)  Thought Processes: (organized)  Thought Content:Normal: Yes  Hallucinations: None  Delusions: No  Memory:Normal: Yes  Insight and Judgment: (improved)  Present Suicidal Ideation: No  Present Homicidal Ideation: No    Kraig Chirinos RN